# Patient Record
(demographics unavailable — no encounter records)

---

## 2024-12-27 NOTE — PHYSICAL EXAM
[General Appearance - In No Acute Distress] : no acute distress [] : no respiratory distress [Abdomen Soft] : soft [Abdomen Tenderness] : non-tender [Oriented To Time, Place, And Person] : oriented to person, place, and time [de-identified] : Bautista draining clear yellow urine

## 2024-12-27 NOTE — ASSESSMENT
[FreeTextEntry1] : 68M with PMH of BPH (s/p TURP 2017 at [facility]), asthma, autonomic dysreflexia, CKD, Factor V Leiden mutation, HLD, HTN, TIA, spinal stenosis s/p L5-S1 ALIF, T2-S2 sacropelvic fixation PSF with rocco osteotomies complicated by post operative urinary retention requiring CIC x3 (outputs 500-700cc), discharged with flomax, bowel regimen and elias catheter on 12/16. On Flomax, with improving mobility/ambulation and having regular bowel movements.   Active TOV performed in office today, filled with 150cc (unable to tolerate more), voided 200 with PVR 19cc. We wanted to check another PVR given his history of autonomic dysreflexia, however the patient had to leave for a PT appointment and could not stay. Return precautions were provided (i.e. difficulty/inability to urinate). Discussed risks of retention with AD (change in HR, BP) and need to alleviate retention urgently should this occur.  Plan: -F/u in 1 month for repeat PVR, assessment of LUTS, PSA - return precautions provided - to office or ED if after office hours - particularly given history of AD

## 2024-12-27 NOTE — PHYSICAL EXAM
[General Appearance - In No Acute Distress] : no acute distress [] : no respiratory distress [Abdomen Soft] : soft [Abdomen Tenderness] : non-tender [Oriented To Time, Place, And Person] : oriented to person, place, and time [de-identified] : Bautista draining clear yellow urine

## 2024-12-27 NOTE — ASSESSMENT
[FreeTextEntry1] : 68M with PMH of BPH (s/p TURP 2017 at [facility]), asthma, autonomic dysreflexia, CKD, Factor V Leiden mutation, HLD, HTN, TIA, spinal stenosis s/p L5-S1 ALIF, T2-S2 sacropelvic fixation PSF with rocco osteotomies complicated by post operative urinary retention requiring CIC x3 (outputs 500-700cc), discharged with flomax, bowel regimen and elias catheter on 12/16. On Flomax, with improving mobility/ambulation and having regular bowel movements.   Active TOV performed in office today, filled with 150cc (unable to tolerate more), voided 200 with PVR 19cc. We wanted to check another PVR given his history of autonomic dysreflexia, however the patient had to leave for a PT appointment and could not stay. Return precautions were provided (i.e. difficulty/inability to urinate). Discussed risks of retention with AD (change in HR, BP) and need to alleviate retention urgently should this occur.  Plan: -F/u in 1 month for repeat PVR, assessment of LUTS, PSA - return precautions provided - to office or ED if after office hours - particularly given history of AD

## 2024-12-27 NOTE — HISTORY OF PRESENT ILLNESS
[FreeTextEntry1] : Initial visit: 12/20/24  68M with PMH of BPH (s/p TURP 2017 at [facility]), asthma, autonomic dysreflexia, CKD, Factor V Leiden mutation, HLD, HTN, TIA, spinal stenosis s/p L5-S1 ALIF, T2-S2 sacropelvic fixation PSF with rocco osteotomies complicated by post operative urinary retention requiring CIC x3 (outputs 500-700cc), discharged with flomax, bowel regimen and elias catheter on 12/16. He is not sure if he has been taking Flomax. He is currently at rehab, and reports difficulty ambulating (with walker). Reports occasional constipation.  12/27/24: Patient presents today for TOV. He has been compliant with Flomax for the past week. Reports improved mobility/ambulation and is now having regular bowel movements.

## 2024-12-27 NOTE — HISTORY OF PRESENT ILLNESS
[FreeTextEntry1] : Initial visit: 12/20/24  68M with PMH of BPH (s/p TURP 2017 at [facility]), asthma, autonomic dysreflexia, CKD, Factor V Leiden mutation, HLD, HTN, TIA, spinal stenosis s/p L5-S1 ALIF, T2-S2 sacropelvic fixation PSF with rocco osteotomies complicated by post operative urinary retention requiring CIC x3 (outputs 500-700cc), discharged with flomax, bowel regimen and elias catheter on 12/16. He is not sure if he has been taking Flomax. He is currently at rehab, and reports difficulty ambulating (with walker). Reports occasional constipation.  12/27/24: Patient presents today for TOV. He has been compliant with Flomax for the past week. Reports improved mobility/ambulation and is now having regular bowel movements.